# Patient Record
Sex: FEMALE | Race: WHITE | NOT HISPANIC OR LATINO
[De-identification: names, ages, dates, MRNs, and addresses within clinical notes are randomized per-mention and may not be internally consistent; named-entity substitution may affect disease eponyms.]

---

## 2024-09-30 PROBLEM — Z00.00 ENCOUNTER FOR PREVENTIVE HEALTH EXAMINATION: Status: ACTIVE | Noted: 2024-09-30

## 2024-11-01 ENCOUNTER — APPOINTMENT (OUTPATIENT)
Dept: ELECTROPHYSIOLOGY | Facility: CLINIC | Age: 54
End: 2024-11-01

## 2024-11-05 ENCOUNTER — RESULT REVIEW (OUTPATIENT)
Age: 54
End: 2024-11-05

## 2024-11-05 ENCOUNTER — APPOINTMENT (OUTPATIENT)
Dept: CARDIOLOGY | Facility: CLINIC | Age: 54
End: 2024-11-05

## 2024-11-05 ENCOUNTER — APPOINTMENT (OUTPATIENT)
Dept: MRI IMAGING | Facility: CLINIC | Age: 54
End: 2024-11-05

## 2024-11-05 ENCOUNTER — OUTPATIENT (OUTPATIENT)
Dept: OUTPATIENT SERVICES | Facility: HOSPITAL | Age: 54
LOS: 1 days | End: 2024-11-05
Payer: COMMERCIAL

## 2024-11-05 DIAGNOSIS — I47.29 OTHER VENTRICULAR TACHYCARDIA: ICD-10-CM

## 2024-11-05 PROCEDURE — 75561 CARDIAC MRI FOR MORPH W/DYE: CPT | Mod: 26

## 2024-11-05 PROCEDURE — 75565 CARD MRI VELOC FLOW MAPPING: CPT | Mod: 26

## 2024-11-11 ENCOUNTER — APPOINTMENT (OUTPATIENT)
Dept: ELECTROPHYSIOLOGY | Facility: CLINIC | Age: 54
End: 2024-11-11

## 2024-11-13 ENCOUNTER — NON-APPOINTMENT (OUTPATIENT)
Age: 54
End: 2024-11-13

## 2024-11-13 ENCOUNTER — APPOINTMENT (OUTPATIENT)
Dept: CARDIOLOGY | Facility: CLINIC | Age: 54
End: 2024-11-13
Payer: COMMERCIAL

## 2024-11-13 VITALS
OXYGEN SATURATION: 100 % | HEART RATE: 64 BPM | DIASTOLIC BLOOD PRESSURE: 64 MMHG | SYSTOLIC BLOOD PRESSURE: 113 MMHG | BODY MASS INDEX: 23.99 KG/M2 | WEIGHT: 162 LBS | HEIGHT: 69 IN

## 2024-11-13 PROCEDURE — 99215 OFFICE O/P EST HI 40 MIN: CPT

## 2024-11-13 PROCEDURE — G2211 COMPLEX E/M VISIT ADD ON: CPT | Mod: NC

## 2024-11-13 PROCEDURE — 93000 ELECTROCARDIOGRAM COMPLETE: CPT

## 2024-12-05 ENCOUNTER — APPOINTMENT (OUTPATIENT)
Dept: ELECTROPHYSIOLOGY | Facility: CLINIC | Age: 54
End: 2024-12-05

## 2025-01-16 ENCOUNTER — APPOINTMENT (OUTPATIENT)
Dept: CARDIOLOGY | Facility: CLINIC | Age: 55
End: 2025-01-16

## 2025-01-22 ENCOUNTER — NON-APPOINTMENT (OUTPATIENT)
Age: 55
End: 2025-01-22

## 2025-01-22 ENCOUNTER — APPOINTMENT (OUTPATIENT)
Dept: CARDIOLOGY | Facility: CLINIC | Age: 55
End: 2025-01-22
Payer: COMMERCIAL

## 2025-01-22 VITALS
OXYGEN SATURATION: 95 % | SYSTOLIC BLOOD PRESSURE: 122 MMHG | BODY MASS INDEX: 25.48 KG/M2 | HEART RATE: 70 BPM | WEIGHT: 172 LBS | DIASTOLIC BLOOD PRESSURE: 74 MMHG | HEIGHT: 69 IN

## 2025-01-22 PROCEDURE — 93000 ELECTROCARDIOGRAM COMPLETE: CPT

## 2025-01-22 PROCEDURE — 99214 OFFICE O/P EST MOD 30 MIN: CPT

## 2025-01-22 PROCEDURE — G2211 COMPLEX E/M VISIT ADD ON: CPT | Mod: NC

## 2025-01-24 ENCOUNTER — APPOINTMENT (OUTPATIENT)
Dept: ELECTROPHYSIOLOGY | Facility: CLINIC | Age: 55
End: 2025-01-24
Payer: COMMERCIAL

## 2025-01-24 VITALS
DIASTOLIC BLOOD PRESSURE: 69 MMHG | HEART RATE: 58 BPM | HEIGHT: 69 IN | BODY MASS INDEX: 25.18 KG/M2 | OXYGEN SATURATION: 98 % | SYSTOLIC BLOOD PRESSURE: 111 MMHG | WEIGHT: 170 LBS

## 2025-01-24 DIAGNOSIS — R00.2 PALPITATIONS: ICD-10-CM

## 2025-01-24 DIAGNOSIS — I47.29 OTHER VENTRICULAR TACHYCARDIA: ICD-10-CM

## 2025-01-24 PROCEDURE — G2211 COMPLEX E/M VISIT ADD ON: CPT | Mod: NC

## 2025-01-24 PROCEDURE — 99204 OFFICE O/P NEW MOD 45 MIN: CPT

## 2025-02-06 ENCOUNTER — APPOINTMENT (OUTPATIENT)
Dept: FAMILY MEDICINE | Facility: CLINIC | Age: 55
End: 2025-02-06
Payer: COMMERCIAL

## 2025-02-06 VITALS
TEMPERATURE: 97.7 F | DIASTOLIC BLOOD PRESSURE: 60 MMHG | BODY MASS INDEX: 25.18 KG/M2 | SYSTOLIC BLOOD PRESSURE: 110 MMHG | WEIGHT: 170 LBS | HEIGHT: 69 IN | OXYGEN SATURATION: 98 % | HEART RATE: 68 BPM

## 2025-02-06 DIAGNOSIS — I47.29 OTHER VENTRICULAR TACHYCARDIA: ICD-10-CM

## 2025-02-06 DIAGNOSIS — J02.9 ACUTE PHARYNGITIS, UNSPECIFIED: ICD-10-CM

## 2025-02-06 DIAGNOSIS — J06.9 ACUTE UPPER RESPIRATORY INFECTION, UNSPECIFIED: ICD-10-CM

## 2025-02-06 DIAGNOSIS — B00.1 HERPESVIRAL VESICULAR DERMATITIS: ICD-10-CM

## 2025-02-06 PROCEDURE — G2211 COMPLEX E/M VISIT ADD ON: CPT | Mod: NC

## 2025-02-06 PROCEDURE — 99214 OFFICE O/P EST MOD 30 MIN: CPT

## 2025-02-19 ENCOUNTER — TRANSCRIPTION ENCOUNTER (OUTPATIENT)
Age: 55
End: 2025-02-19

## 2025-02-19 ENCOUNTER — INPATIENT (INPATIENT)
Facility: HOSPITAL | Age: 55
LOS: 0 days | Discharge: HOME | DRG: 310 | End: 2025-02-20
Attending: INTERNAL MEDICINE | Admitting: INTERNAL MEDICINE
Payer: COMMERCIAL

## 2025-02-19 VITALS
RESPIRATION RATE: 16 BRPM | TEMPERATURE: 98 F | SYSTOLIC BLOOD PRESSURE: 161 MMHG | HEART RATE: 58 BPM | DIASTOLIC BLOOD PRESSURE: 73 MMHG | OXYGEN SATURATION: 96 %

## 2025-02-19 DIAGNOSIS — I47.20 VENTRICULAR TACHYCARDIA, UNSPECIFIED: ICD-10-CM

## 2025-02-19 LAB
ABO RH CONFIRMATION: SIGNIFICANT CHANGE UP
HCG SERPL QL: NEGATIVE — SIGNIFICANT CHANGE UP

## 2025-02-19 PROCEDURE — 93623 PRGRMD STIMJ&PACG IV RX NFS: CPT | Mod: 26

## 2025-02-19 PROCEDURE — 99152 MOD SED SAME PHYS/QHP 5/>YRS: CPT

## 2025-02-19 PROCEDURE — 93662 INTRACARDIAC ECG (ICE): CPT | Mod: 26

## 2025-02-19 PROCEDURE — 93654 COMPRE EP EVAL TX VT: CPT

## 2025-02-19 RX ORDER — ATORVASTATIN CALCIUM 80 MG/1
40 TABLET, FILM COATED ORAL AT BEDTIME
Refills: 0 | Status: DISCONTINUED | OUTPATIENT
Start: 2025-02-19 | End: 2025-02-20

## 2025-02-19 RX ORDER — ALPRAZOLAM 0.5 MG
0.25 TABLET, EXTENDED RELEASE 24 HR ORAL EVERY 6 HOURS
Refills: 0 | Status: DISCONTINUED | OUTPATIENT
Start: 2025-02-19 | End: 2025-02-20

## 2025-02-19 RX ORDER — ONDANSETRON HCL/PF 4 MG/2 ML
4 VIAL (ML) INJECTION EVERY 6 HOURS
Refills: 0 | Status: DISCONTINUED | OUTPATIENT
Start: 2025-02-19 | End: 2025-02-20

## 2025-02-19 RX ORDER — OXYCODONE HYDROCHLORIDE AND ACETAMINOPHEN 10; 325 MG/1; MG/1
1 TABLET ORAL EVERY 6 HOURS
Refills: 0 | Status: DISCONTINUED | OUTPATIENT
Start: 2025-02-19 | End: 2025-02-20

## 2025-02-19 RX ORDER — KETOROLAC TROMETHAMINE 30 MG/ML
15 INJECTION, SOLUTION INTRAMUSCULAR; INTRAVENOUS EVERY 6 HOURS
Refills: 0 | Status: DISCONTINUED | OUTPATIENT
Start: 2025-02-19 | End: 2025-02-20

## 2025-02-19 RX ORDER — ASPIRIN 325 MG
81 TABLET ORAL DAILY
Refills: 0 | Status: DISCONTINUED | OUTPATIENT
Start: 2025-02-19 | End: 2025-02-20

## 2025-02-19 RX ORDER — ACETAMINOPHEN 500 MG/5ML
650 LIQUID (ML) ORAL EVERY 6 HOURS
Refills: 0 | Status: DISCONTINUED | OUTPATIENT
Start: 2025-02-19 | End: 2025-02-20

## 2025-02-19 RX ORDER — MAGNESIUM, ALUMINUM HYDROXIDE 200-200 MG
30 TABLET,CHEWABLE ORAL EVERY 4 HOURS
Refills: 0 | Status: DISCONTINUED | OUTPATIENT
Start: 2025-02-19 | End: 2025-02-20

## 2025-02-19 RX ORDER — MAGNESIUM OXIDE 400 MG
400 TABLET ORAL DAILY
Refills: 0 | Status: DISCONTINUED | OUTPATIENT
Start: 2025-02-19 | End: 2025-02-20

## 2025-02-19 RX ORDER — ONDANSETRON HCL/PF 4 MG/2 ML
4 VIAL (ML) INJECTION EVERY 6 HOURS
Refills: 0 | Status: DISCONTINUED | OUTPATIENT
Start: 2025-02-19 | End: 2025-02-19

## 2025-02-19 RX ADMIN — Medication 81 MILLIGRAM(S): at 15:05

## 2025-02-19 RX ADMIN — Medication 400 MILLIGRAM(S): at 15:05

## 2025-02-19 RX ADMIN — OXYCODONE HYDROCHLORIDE AND ACETAMINOPHEN 1 TABLET(S): 10; 325 TABLET ORAL at 22:15

## 2025-02-19 RX ADMIN — Medication 650 MILLIGRAM(S): at 15:05

## 2025-02-19 RX ADMIN — OXYCODONE HYDROCHLORIDE AND ACETAMINOPHEN 1 TABLET(S): 10; 325 TABLET ORAL at 21:32

## 2025-02-19 RX ADMIN — ATORVASTATIN CALCIUM 40 MILLIGRAM(S): 80 TABLET, FILM COATED ORAL at 21:31

## 2025-02-19 RX ADMIN — KETOROLAC TROMETHAMINE 15 MILLIGRAM(S): 30 INJECTION, SOLUTION INTRAMUSCULAR; INTRAVENOUS at 18:01

## 2025-02-19 NOTE — DISCHARGE NOTE PROVIDER - NSDCFUADDINST_GEN_ALL_CORE_FT
Follow up with Dr. Conteh at [facility] in 3-4 weeks. Please call the office with any questions or concerns.     Take aspirin 81mg daily for 1 month.   Stop metoprolol and flecainide.   Continue other home medications.  Follow up with Dr. Conteh at [facility] in 3-4 weeks. Please call the office with any questions or concerns.     Take aspirin 81mg daily for 1 month.   Stop metoprolol only for now. Reduce Flecainide to 50mg twice daily.   Continue other home medications.

## 2025-02-19 NOTE — DISCHARGE NOTE PROVIDER - NSDCMRMEDTOKEN_GEN_ALL_CORE_FT
flecainide 100 mg oral tablet: 1 tab(s) orally 2 times a day  Lipitor 40 mg oral tablet: 1 tab(s) orally once a day (at bedtime)  magnesium oxide: 400 milligram(s) orally once a day  metoprolol succinate 25 mg oral capsule, extended release: 1 cap(s) orally once a day  Valtrex 1 g oral tablet: 2 tab(s) orally 2 times a day as needed for cold sore   aspirin 81 mg oral delayed release tablet: 1 tab(s) orally once a day x 30 days  Lipitor 40 mg oral tablet: 1 tab(s) orally once a day (at bedtime)  magnesium oxide: 400 milligram(s) orally once a day  Valtrex 1 g oral tablet: 2 tab(s) orally 2 times a day as needed for cold sore   aspirin 81 mg oral delayed release tablet: 1 tab(s) orally once a day x 30 days  flecainide 50 mg oral tablet: 1 tab(s) orally 2 times a day  Lipitor 40 mg oral tablet: 1 tab(s) orally once a day (at bedtime)  magnesium oxide: 400 milligram(s) orally once a day  Valtrex 1 g oral tablet: 2 tab(s) orally 2 times a day as needed for cold sore

## 2025-02-19 NOTE — DISCHARGE NOTE PROVIDER - CARE PROVIDER_API CALL
Jennifer Erwin  Cardiac Electrophysiology  [address] [place] [address]  Phone: [phone]  Fax: [phone]  Follow Up Time:     Andrea Conteh)  Cardiology  [address] [facility], [facility] [address]  [place] [address]  Phone: [phone]  Fax: [phone]  Follow Up Time:

## 2025-02-19 NOTE — DISCHARGE NOTE PROVIDER - HOSPITAL COURSE
55 year old female with MVP, lumbar and cervical herniated disks. She was recently hospitalized 9/2024 with palpitation and near syncope, found to have NSVT. She has been on flecainide and metoprolol with symptom relief. She presented electively and is now status post EPS and uncomplicated radiofrequency PVC ablation (RVOT). The patient was observed overnight without event and was discharged home the following morning with a plan for outpatient follow up.

## 2025-02-19 NOTE — DISCHARGE NOTE PROVIDER - NSDCCPTREATMENT_GEN_ALL_CORE_FT
PRINCIPAL PROCEDURE  Procedure: Radiofrequency ablation of arrhythmogenic focus for premature ventricular contractions or ventricular tachycardia  Findings and Treatment: - Bruising at the groin, sometimes extending down the leg, and/or a small lump under the skin at the groin access site is normal and will resolve within 2 – 3 weeks.   - Occasional skipped beats or palpitations that last for a few beats are common and generally resolve within 1-2 months.   - You may walk and take stairs at a regular pace.   - Do not perform any exercise more strenuous than walking for 1 week.   - Do not strain or lift heavy objects for 1 week.  - You may shower the day after the procedure.  - Do not soak in water (such as tub baths, hot tubs, swimming, etc.) for 1 week.   - You may resume all other activities the day after the procedure.  Call your doctor if:   - you notice bleeding, redness, drainage, swelling, increased tenderness or a hot sensation around the catheter insertion site.   - your temperature is greater than 100 degrees F for more than 24 hours.  - your rapid heart rhythm returns.  - you have any questions or concerns regarding the procedure.  If significant bleeding and/or a large lump (the size of a golf ball or bigger) occurs:  - Lie flat and apply continuous direct pressure just above the puncture site for at least 10 minutes  - If the issue resolves, notify your physician immediately.    - If the bleeding cannot be controlled, please seek immediate medical attention.  If you experience increased difficulty breathing or chest pain, or if you faint or have dizzy spells, please seek immediate medical attention.

## 2025-02-19 NOTE — PROGRESS NOTE ADULT - SUBJECTIVE AND OBJECTIVE BOX
PROCEDURE(S): Radiofrequency PVC Ablation   ELECTROPHYSIOLOGIST(S): Jennifer Erwin MD  COMPLICATIONS:  none        DISPOSITION: observation      CONDITION: stable    Pt doing well s/p radiofrequency PVC ablation via b/l FV access. Denies complaint post procedure.     I/Os: net +    MEDICATIONS  (STANDING):    MEDICATIONS  (PRN):  acetaminophen     Tablet .. 650 milliGRAM(s) Oral every 6 hours PRN Mild Pain (1 - 3), Moderate Pain (4 - 6)  ALPRAZolam 0.25 milliGRAM(s) Oral every 6 hours PRN anxiety/insomnia  aluminum hydroxide/magnesium hydroxide/simethicone Suspension 30 milliLiter(s) Oral every 4 hours PRN Dyspepsia  benzocaine/menthol Lozenge 1 Lozenge Oral every 2 hours PRN Sore Throat  ketorolac   Injectable 15 milliGRAM(s) IV Push every 6 hours PRN Moderate Pain (4 - 6)  ondansetron  IVPB 4 milliGRAM(s) IV Intermittent every 6 hours PRN Nausea and/or Vomiting  oxycodone    5 mG/acetaminophen 325 mG 1 Tablet(s) Oral every 6 hours PRN Severe Pain (7 - 10)    Allergies:  No Known Allergies    T(C): 36.5 (02-19-25 @ 07:13), Max: 36.5 (02-19-25 @ 07:13)  HR: 52 (02-19-25 @ 11:10) (52 - 58)  BP: 88/49 (02-19-25 @ 11:10) (88/49 - 161/73)  RR: 16 (02-19-25 @ 11:10) (16 - 16)  SpO2: 96% (02-19-25 @ 11:10) (96% - 96%)  Post-procedure VS: BP 89/48 HR 52 O2 sat 94% RR 16     Physical exam:   awake, alert, no obvious distress   Card: S1/S2, RRR, no m/g/r  Resp: lungs CTA b/l  Abd: S/NT/ND  Groins: hemostatic sutures in place; sites C/D/I; no bleeding, hematoma, erythema, exudate or edema  Ext: no edema; distal pulses intact  Skin: warm, dry, no rash     ECG: sinus bradycardia 52bpm     Assessment:   55 year old female with MVP, lumbar and cervical herniated disks. She was recently hospitalized 9/2024 with palpitation and near syncope, found to have NSVT. She has been on flecainide and metoprolol with symptom relief. She presented electively and is now status post EPS and uncomplicated radiofrequency PVC ablation (RVOT).     Plan:   Bedrest x 4 hours, then OOB with assistance and progress as tolerated.   Groin sutures to be removed by EP service in AM.   Continue home medications.   Please encourage incentive spirometry and ambulation once able.  Observation and monitoring on telemetry overnight with anticipated discharge in the AM and outpt follow up in 2-4 weeks.   PROCEDURE(S): Radiofrequency PVC Ablation   ELECTROPHYSIOLOGIST(S): Jennifer Erwin MD  COMPLICATIONS:  none        DISPOSITION: observation      CONDITION: stable    Pt doing well s/p radiofrequency PVC (RVOT) ablation via b/l FV access. Denies complaint post procedure.     I/Os: net +    MEDICATIONS  (STANDING):    MEDICATIONS  (PRN):  acetaminophen     Tablet .. 650 milliGRAM(s) Oral every 6 hours PRN Mild Pain (1 - 3), Moderate Pain (4 - 6)  ALPRAZolam 0.25 milliGRAM(s) Oral every 6 hours PRN anxiety/insomnia  aluminum hydroxide/magnesium hydroxide/simethicone Suspension 30 milliLiter(s) Oral every 4 hours PRN Dyspepsia  benzocaine/menthol Lozenge 1 Lozenge Oral every 2 hours PRN Sore Throat  ketorolac   Injectable 15 milliGRAM(s) IV Push every 6 hours PRN Moderate Pain (4 - 6)  ondansetron  IVPB 4 milliGRAM(s) IV Intermittent every 6 hours PRN Nausea and/or Vomiting  oxycodone    5 mG/acetaminophen 325 mG 1 Tablet(s) Oral every 6 hours PRN Severe Pain (7 - 10)    Allergies:  No Known Allergies    T(C): 36.5 (02-19-25 @ 07:13), Max: 36.5 (02-19-25 @ 07:13)  HR: 52 (02-19-25 @ 11:10) (52 - 58)  BP: 88/49 (02-19-25 @ 11:10) (88/49 - 161/73)  RR: 16 (02-19-25 @ 11:10) (16 - 16)  SpO2: 96% (02-19-25 @ 11:10) (96% - 96%)  Post-procedure VS: BP 89/48 HR 52 O2 sat 94% RR 16     Physical exam:   awake, alert, no obvious distress   Card: S1/S2, RRR, no m/g/r  Resp: lungs CTA b/l  Abd: S/NT/ND  Groins: hemostatic sutures in place; sites C/D/I; no bleeding, hematoma, erythema, exudate or edema  Ext: no edema; distal pulses intact  Skin: warm, dry, no rash     ECG: sinus bradycardia 52bpm     Assessment:   55 year old female with MVP, lumbar and cervical herniated disks. She was recently hospitalized 9/2024 with palpitation and near syncope, found to have NSVT. She has been on flecainide and metoprolol with symptom relief. She presented electively and is now status post EPS and uncomplicated radiofrequency PVC ablation (RVOT).     Plan:   Bedrest x 4 hours, then OOB with assistance and progress as tolerated.   Groin sutures to be removed by EP service in AM.   Start aspirin 81mg daily for 1 month.   Stop flecainide and metoprolol.   Continue other home medications.   Please encourage incentive spirometry and ambulation once able.  Observation and monitoring on telemetry overnight with anticipated discharge in the AM and outpt follow up in 2-4 weeks.

## 2025-02-19 NOTE — PROGRESS NOTE ADULT - SUBJECTIVE AND OBJECTIVE BOX
55 year old female with MVP, lumbar and cervical herniated disks. She was recently hospitalized with c/o for dizziness and palpitation, found to have NSVT and PVCs.     55 year old female with MVP, lumbar and cervical herniated disks. She was recently hospitalized 9/2024 with palpitation and near syncope, found to have NSVT. She has been on flecainide and metoprolol with symptom relief. She presents today for elective EPS and catheter ablation of PVC/VT.     PST and labs from 2/13/25 reviewed; pt reports exacerbated back pain over the past few days. She feels she will be able to lie flat during recovery.   Confirms NPO > 8 hrs. Last flecainide and metoprolol 2/14/25.     - iv heplock  - confirmatory type and screen  - 2 units PRBC on hold  - consent w/ MD

## 2025-02-19 NOTE — DISCHARGE NOTE PROVIDER - CARE PROVIDERS DIRECT ADDRESSES
[email or web address]

## 2025-02-19 NOTE — ASU PATIENT PROFILE, ADULT - NSICDXPASTMEDICALHX_GEN_ALL_CORE_FT
PAST MEDICAL HISTORY:  H/O ventricular tachycardia     Herniated cervical disc     Herpes     MVP (mitral valve prolapse)     NSVT (nonsustained ventricular tachycardia)

## 2025-02-20 ENCOUNTER — TRANSCRIPTION ENCOUNTER (OUTPATIENT)
Age: 55
End: 2025-02-20

## 2025-02-20 VITALS
OXYGEN SATURATION: 95 % | TEMPERATURE: 98 F | SYSTOLIC BLOOD PRESSURE: 116 MMHG | RESPIRATION RATE: 18 BRPM | HEART RATE: 58 BPM | DIASTOLIC BLOOD PRESSURE: 69 MMHG

## 2025-02-20 DIAGNOSIS — I47.20 VENTRICULAR TACHYCARDIA, UNSPECIFIED: ICD-10-CM

## 2025-02-20 DIAGNOSIS — I49.3 VENTRICULAR PREMATURE DEPOLARIZATION: ICD-10-CM

## 2025-02-20 LAB
ANION GAP SERPL CALC-SCNC: 11 MMOL/L — SIGNIFICANT CHANGE UP (ref 5–17)
BUN SERPL-MCNC: 12.9 MG/DL — SIGNIFICANT CHANGE UP (ref 8–20)
CALCIUM SERPL-MCNC: 8.5 MG/DL — SIGNIFICANT CHANGE UP (ref 8.4–10.5)
CHLORIDE SERPL-SCNC: 106 MMOL/L — SIGNIFICANT CHANGE UP (ref 96–108)
CO2 SERPL-SCNC: 24 MMOL/L — SIGNIFICANT CHANGE UP (ref 22–29)
CREAT SERPL-MCNC: 0.54 MG/DL — SIGNIFICANT CHANGE UP (ref 0.5–1.3)
EGFR: 109 ML/MIN/1.73M2 — SIGNIFICANT CHANGE UP
GLUCOSE SERPL-MCNC: 95 MG/DL — SIGNIFICANT CHANGE UP (ref 70–99)
HCT VFR BLD CALC: 38.4 % — SIGNIFICANT CHANGE UP (ref 34.5–45)
HGB BLD-MCNC: 12.3 G/DL — SIGNIFICANT CHANGE UP (ref 11.5–15.5)
MAGNESIUM SERPL-MCNC: 2 MG/DL — SIGNIFICANT CHANGE UP (ref 1.6–2.6)
MCHC RBC-ENTMCNC: 30.7 PG — SIGNIFICANT CHANGE UP (ref 27–34)
MCHC RBC-ENTMCNC: 32 G/DL — SIGNIFICANT CHANGE UP (ref 32–36)
MCV RBC AUTO: 95.8 FL — SIGNIFICANT CHANGE UP (ref 80–100)
NRBC # BLD AUTO: 0 K/UL — SIGNIFICANT CHANGE UP (ref 0–0)
NRBC # FLD: 0 K/UL — SIGNIFICANT CHANGE UP (ref 0–0)
NRBC BLD AUTO-RTO: 0 /100 WBCS — SIGNIFICANT CHANGE UP (ref 0–0)
PLATELET # BLD AUTO: 240 K/UL — SIGNIFICANT CHANGE UP (ref 150–400)
PMV BLD: 9.5 FL — SIGNIFICANT CHANGE UP (ref 7–13)
POTASSIUM SERPL-MCNC: 4.5 MMOL/L — SIGNIFICANT CHANGE UP (ref 3.5–5.3)
POTASSIUM SERPL-SCNC: 4.5 MMOL/L — SIGNIFICANT CHANGE UP (ref 3.5–5.3)
RBC # BLD: 4.01 M/UL — SIGNIFICANT CHANGE UP (ref 3.8–5.2)
RBC # FLD: 12.8 % — SIGNIFICANT CHANGE UP (ref 10.3–14.5)
SODIUM SERPL-SCNC: 141 MMOL/L — SIGNIFICANT CHANGE UP (ref 135–145)
WBC # BLD: 6.09 K/UL — SIGNIFICANT CHANGE UP (ref 3.8–10.5)
WBC # FLD AUTO: 6.09 K/UL — SIGNIFICANT CHANGE UP (ref 3.8–10.5)

## 2025-02-20 PROCEDURE — C1759: CPT

## 2025-02-20 PROCEDURE — 93623 PRGRMD STIMJ&PACG IV RX NFS: CPT

## 2025-02-20 PROCEDURE — 36415 COLL VENOUS BLD VENIPUNCTURE: CPT

## 2025-02-20 PROCEDURE — C1766: CPT

## 2025-02-20 PROCEDURE — C1730: CPT

## 2025-02-20 PROCEDURE — C1732: CPT

## 2025-02-20 PROCEDURE — 80048 BASIC METABOLIC PNL TOTAL CA: CPT

## 2025-02-20 PROCEDURE — 83735 ASSAY OF MAGNESIUM: CPT

## 2025-02-20 PROCEDURE — 99231 SBSQ HOSP IP/OBS SF/LOW 25: CPT

## 2025-02-20 PROCEDURE — C1894: CPT

## 2025-02-20 PROCEDURE — 93010 ELECTROCARDIOGRAM REPORT: CPT

## 2025-02-20 PROCEDURE — 93005 ELECTROCARDIOGRAM TRACING: CPT

## 2025-02-20 PROCEDURE — 85027 COMPLETE CBC AUTOMATED: CPT

## 2025-02-20 PROCEDURE — 93654 COMPRE EP EVAL TX VT: CPT

## 2025-02-20 PROCEDURE — 93662 INTRACARDIAC ECG (ICE): CPT

## 2025-02-20 PROCEDURE — 84703 CHORIONIC GONADOTROPIN ASSAY: CPT

## 2025-02-20 RX ORDER — ASPIRIN 325 MG
1 TABLET ORAL
Qty: 30 | Refills: 0
Start: 2025-02-20 | End: 2025-03-21

## 2025-02-20 RX ORDER — FLECAINIDE ACETATE 50 MG
1 TABLET ORAL
Qty: 60 | Refills: 0
Start: 2025-02-20 | End: 2025-03-21

## 2025-02-20 RX ADMIN — Medication 0.25 MILLIGRAM(S): at 01:10

## 2025-02-20 RX ADMIN — OXYCODONE HYDROCHLORIDE AND ACETAMINOPHEN 1 TABLET(S): 10; 325 TABLET ORAL at 07:52

## 2025-02-20 RX ADMIN — Medication 650 MILLIGRAM(S): at 02:00

## 2025-02-20 RX ADMIN — OXYCODONE HYDROCHLORIDE AND ACETAMINOPHEN 1 TABLET(S): 10; 325 TABLET ORAL at 08:52

## 2025-02-20 RX ADMIN — Medication 81 MILLIGRAM(S): at 07:52

## 2025-02-20 RX ADMIN — Medication 400 MILLIGRAM(S): at 07:53

## 2025-02-20 RX ADMIN — Medication 650 MILLIGRAM(S): at 01:09

## 2025-02-20 NOTE — PROGRESS NOTE ADULT - SUBJECTIVE AND OBJECTIVE BOX
Patient seen today in bed. Figure 8 sutures removed from right and left groin without complications. No overnight events.     EKG: SR at 57bpm; QRSD 80ms; AK 166ms  TELE: SR, no PVCs.     MEDICATIONS  (STANDING):  aspirin enteric coated 81 milliGRAM(s) Oral daily  atorvastatin 40 milliGRAM(s) Oral at bedtime  magnesium oxide 400 milliGRAM(s) Oral daily    MEDICATIONS  (PRN):  acetaminophen     Tablet .. 650 milliGRAM(s) Oral every 6 hours PRN Mild Pain (1 - 3), Moderate Pain (4 - 6)  ALPRAZolam 0.25 milliGRAM(s) Oral every 6 hours PRN anxiety/insomnia  aluminum hydroxide/magnesium hydroxide/simethicone Suspension 30 milliLiter(s) Oral every 4 hours PRN Dyspepsia  benzocaine/menthol Lozenge 1 Lozenge Oral every 2 hours PRN Sore Throat  ketorolac   Injectable 15 milliGRAM(s) IV Push every 6 hours PRN Moderate Pain (4 - 6)  ondansetron Injectable 4 milliGRAM(s) IV Push every 6 hours PRN Nausea and/or Vomiting  oxycodone    5 mG/acetaminophen 325 mG 1 Tablet(s) Oral every 6 hours PRN Severe Pain (7 - 10)    Allergies  No Known Allergies    PAST MEDICAL & SURGICAL HISTORY:  H/O ventricular tachycardia  MVP (mitral valve prolapse)  Herniated cervical disc  NSVT (nonsustained ventricular tachycardia)  Herpes    Vital Signs Last 24 Hrs  T(C): 36.6 (20 Feb 2025 08:44), Max: 36.6 (20 Feb 2025 08:44)  T(F): 97.8 (20 Feb 2025 08:44), Max: 97.8 (20 Feb 2025 08:44)  HR: 58 (20 Feb 2025 08:44) (44 - 61)  BP: 116/69 (20 Feb 2025 08:44) (83/46 - 146/78)  BP(mean): 89 (20 Feb 2025 01:00) (89 - 101)  RR: 18 (20 Feb 2025 08:44) (16 - 19)  SpO2: 95% (20 Feb 2025 08:44) (94% - 98%)    Physical Exam:  Constitutional: NAD, AAOx3  Cardiovascular: +S1S2 RRR  Pulmonary: CTA b/l, unlabored  GI: soft NTND +BS  Extremities: no pedal edema,   Right and left groin: No hematoma or ecchymosis noted.   Neuro: non focal, ARCHIBALD x4    LABS:                        12.3   6.09  )-----------( 240      ( 20 Feb 2025 04:37 )             38.4     141  |  106  |  12.9  ----------------------------<  95  4.5   |  24.0  |  0.54  Ca    8.5      20 Feb 2025 04:37  Mg     2.0     02-20    A/P  55 year old female with MVP, lumbar and cervical herniated disks. She was recently hospitalized 9/2024 with palpitation and near syncope, found to have NSVT. She has been on flecainide and metoprolol with symptom relief. She presented electively and is now POD#1 status post EPS and uncomplicated radiofrequency PVC ablation (RVOT).     - ASA 81mg x 30 days Rx sent to pharmacy  - Off Flecainide and Lopressor  - Discharge home today.      Patient seen today in bed. Figure 8 sutures removed from right and left groin without complications. No overnight events.     EKG: SR at 57bpm; QRSD 80ms; WA 166ms  TELE: SR, no PVCs.     MEDICATIONS  (STANDING):  aspirin enteric coated 81 milliGRAM(s) Oral daily  atorvastatin 40 milliGRAM(s) Oral at bedtime  magnesium oxide 400 milliGRAM(s) Oral daily    MEDICATIONS  (PRN):  acetaminophen     Tablet .. 650 milliGRAM(s) Oral every 6 hours PRN Mild Pain (1 - 3), Moderate Pain (4 - 6)  ALPRAZolam 0.25 milliGRAM(s) Oral every 6 hours PRN anxiety/insomnia  aluminum hydroxide/magnesium hydroxide/simethicone Suspension 30 milliLiter(s) Oral every 4 hours PRN Dyspepsia  benzocaine/menthol Lozenge 1 Lozenge Oral every 2 hours PRN Sore Throat  ketorolac   Injectable 15 milliGRAM(s) IV Push every 6 hours PRN Moderate Pain (4 - 6)  ondansetron Injectable 4 milliGRAM(s) IV Push every 6 hours PRN Nausea and/or Vomiting  oxycodone    5 mG/acetaminophen 325 mG 1 Tablet(s) Oral every 6 hours PRN Severe Pain (7 - 10)    Allergies  No Known Allergies    PAST MEDICAL & SURGICAL HISTORY:  H/O ventricular tachycardia  MVP (mitral valve prolapse)  Herniated cervical disc  NSVT (nonsustained ventricular tachycardia)  Herpes    Vital Signs Last 24 Hrs  T(C): 36.6 (20 Feb 2025 08:44), Max: 36.6 (20 Feb 2025 08:44)  T(F): 97.8 (20 Feb 2025 08:44), Max: 97.8 (20 Feb 2025 08:44)  HR: 58 (20 Feb 2025 08:44) (44 - 61)  BP: 116/69 (20 Feb 2025 08:44) (83/46 - 146/78)  BP(mean): 89 (20 Feb 2025 01:00) (89 - 101)  RR: 18 (20 Feb 2025 08:44) (16 - 19)  SpO2: 95% (20 Feb 2025 08:44) (94% - 98%)    Physical Exam:  Constitutional: NAD, AAOx3  Cardiovascular: +S1S2 RRR  Pulmonary: CTA b/l, unlabored  GI: soft NTND +BS  Extremities: no pedal edema,   Right and left groin: No hematoma or ecchymosis noted.   Neuro: non focal, ARCHIBALD x4    LABS:                        12.3   6.09  )-----------( 240      ( 20 Feb 2025 04:37 )             38.4     141  |  106  |  12.9  ----------------------------<  95  4.5   |  24.0  |  0.54  Ca    8.5      20 Feb 2025 04:37  Mg     2.0     02-20    A/P  55 year old female with MVP, lumbar and cervical herniated disks. She was recently hospitalized 9/2024 with palpitation and near syncope, found to have NSVT. She has been on flecainide and metoprolol with symptom relief. She presented electively and is now POD#1 status post EPS and uncomplicated radiofrequency PVC ablation (RVOT).     - ASA 81mg x 30 days Rx sent to pharmacy  - Off Lopressor  - Reduce Flecainide to 50mg PO BID   - Discharge home today.      Patient seen today in bed. Figure 8 sutures removed from right and left groin without complications. No overnight events.     EKG: SR at 57bpm; QRSD 80ms; MN 166ms  TELE: SR, occasional pvc    MEDICATIONS  (STANDING):  aspirin enteric coated 81 milliGRAM(s) Oral daily  atorvastatin 40 milliGRAM(s) Oral at bedtime  magnesium oxide 400 milliGRAM(s) Oral daily    MEDICATIONS  (PRN):  acetaminophen     Tablet .. 650 milliGRAM(s) Oral every 6 hours PRN Mild Pain (1 - 3), Moderate Pain (4 - 6)  ALPRAZolam 0.25 milliGRAM(s) Oral every 6 hours PRN anxiety/insomnia  aluminum hydroxide/magnesium hydroxide/simethicone Suspension 30 milliLiter(s) Oral every 4 hours PRN Dyspepsia  benzocaine/menthol Lozenge 1 Lozenge Oral every 2 hours PRN Sore Throat  ketorolac   Injectable 15 milliGRAM(s) IV Push every 6 hours PRN Moderate Pain (4 - 6)  ondansetron Injectable 4 milliGRAM(s) IV Push every 6 hours PRN Nausea and/or Vomiting  oxycodone    5 mG/acetaminophen 325 mG 1 Tablet(s) Oral every 6 hours PRN Severe Pain (7 - 10)    Allergies  No Known Allergies    PAST MEDICAL & SURGICAL HISTORY:  H/O ventricular tachycardia  MVP (mitral valve prolapse)  Herniated cervical disc  NSVT (nonsustained ventricular tachycardia)  Herpes    Vital Signs Last 24 Hrs  T(C): 36.6 (20 Feb 2025 08:44), Max: 36.6 (20 Feb 2025 08:44)  T(F): 97.8 (20 Feb 2025 08:44), Max: 97.8 (20 Feb 2025 08:44)  HR: 58 (20 Feb 2025 08:44) (44 - 61)  BP: 116/69 (20 Feb 2025 08:44) (83/46 - 146/78)  BP(mean): 89 (20 Feb 2025 01:00) (89 - 101)  RR: 18 (20 Feb 2025 08:44) (16 - 19)  SpO2: 95% (20 Feb 2025 08:44) (94% - 98%)    Physical Exam:  Constitutional: NAD, AAOx3  Cardiovascular: +S1S2 RRR  Pulmonary: CTA b/l, unlabored  GI: soft NTND +BS  Extremities: no pedal edema,   Right and left groin: No hematoma or ecchymosis noted.   Neuro: non focal, ARCHIBALD x4    LABS:                        12.3   6.09  )-----------( 240      ( 20 Feb 2025 04:37 )             38.4     141  |  106  |  12.9  ----------------------------<  95  4.5   |  24.0  |  0.54  Ca    8.5      20 Feb 2025 04:37  Mg     2.0     02-20    A/P  55 year old female with MVP, lumbar and cervical herniated disks. She was recently hospitalized 9/2024 with palpitation and near syncope, found to have NSVT. She has been on flecainide and metoprolol with symptom relief. She presented electively and is now POD#1 status post EPS and uncomplicated radiofrequency PVC ablation (RVOT).     - ASA 81mg x 30 days Rx sent to pharmacy  - Off Lopressor  - Reduce Flecainide to 50mg PO BID   - Discharge home today.

## 2025-02-20 NOTE — DISCHARGE NOTE NURSING/CASE MANAGEMENT/SOCIAL WORK - PATIENT PORTAL LINK FT
You can access the FollowMyHealth Patient Portal offered by [facility] by registering at the following website: [email or web address]. By joining [facility]’s FollowMyHealth portal, you will also be able to view your health information using other applications (apps) compatible with our system.

## 2025-02-20 NOTE — PROGRESS NOTE ADULT - NS ATTEND AMEND GEN_ALL_CORE FT
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

## 2025-02-20 NOTE — DISCHARGE NOTE NURSING/CASE MANAGEMENT/SOCIAL WORK - FINANCIAL ASSISTANCE
[facility] provides services at a reduced cost to those who are determined to be eligible through [facility]’s financial assistance program. Information regarding [facility]’s financial assistance program can be found by going to [email or web address] or by calling [phone].

## 2025-02-26 ENCOUNTER — APPOINTMENT (OUTPATIENT)
Dept: FAMILY MEDICINE | Facility: CLINIC | Age: 55
End: 2025-02-26
Payer: COMMERCIAL

## 2025-02-26 VITALS
DIASTOLIC BLOOD PRESSURE: 70 MMHG | HEIGHT: 68 IN | HEART RATE: 59 BPM | SYSTOLIC BLOOD PRESSURE: 100 MMHG | OXYGEN SATURATION: 95 % | WEIGHT: 172 LBS | TEMPERATURE: 98.8 F | BODY MASS INDEX: 26.07 KG/M2

## 2025-02-26 DIAGNOSIS — M54.2 CERVICALGIA: ICD-10-CM

## 2025-02-26 DIAGNOSIS — M54.9 CERVICALGIA: ICD-10-CM

## 2025-02-26 DIAGNOSIS — E66.3 OVERWEIGHT: ICD-10-CM

## 2025-02-26 DIAGNOSIS — G89.29 CERVICALGIA: ICD-10-CM

## 2025-02-26 PROCEDURE — 99214 OFFICE O/P EST MOD 30 MIN: CPT

## 2025-02-26 PROCEDURE — G2211 COMPLEX E/M VISIT ADD ON: CPT | Mod: NC

## 2025-02-28 PROBLEM — E66.3 OVERWEIGHT: Status: ACTIVE | Noted: 2025-02-28

## 2025-02-28 RX ORDER — METHOCARBAMOL 750 MG/1
750 TABLET, FILM COATED ORAL 3 TIMES DAILY
Qty: 30 | Refills: 0 | Status: ACTIVE | COMMUNITY
Start: 2025-02-28 | End: 1900-01-01

## 2025-03-04 ENCOUNTER — APPOINTMENT (OUTPATIENT)
Dept: CARDIOLOGY | Facility: CLINIC | Age: 55
End: 2025-03-04
Payer: COMMERCIAL

## 2025-03-04 ENCOUNTER — NON-APPOINTMENT (OUTPATIENT)
Age: 55
End: 2025-03-04

## 2025-03-04 VITALS
BODY MASS INDEX: 26.22 KG/M2 | SYSTOLIC BLOOD PRESSURE: 128 MMHG | DIASTOLIC BLOOD PRESSURE: 70 MMHG | HEIGHT: 68 IN | WEIGHT: 173 LBS | OXYGEN SATURATION: 94 % | HEART RATE: 64 BPM

## 2025-03-04 PROCEDURE — 93000 ELECTROCARDIOGRAM COMPLETE: CPT

## 2025-03-04 PROCEDURE — 99215 OFFICE O/P EST HI 40 MIN: CPT

## 2025-03-04 PROCEDURE — G2211 COMPLEX E/M VISIT ADD ON: CPT | Mod: NC

## 2025-03-07 RX ORDER — SEMAGLUTIDE 0.68 MG/ML
2 INJECTION, SOLUTION SUBCUTANEOUS
Qty: 1 | Refills: 1 | Status: DISCONTINUED | COMMUNITY
Start: 2025-02-28 | End: 2025-03-04

## 2025-03-12 ENCOUNTER — APPOINTMENT (OUTPATIENT)
Dept: CARDIOLOGY | Facility: CLINIC | Age: 55
End: 2025-03-12

## 2025-03-12 PROBLEM — I47.29 OTHER VENTRICULAR TACHYCARDIA: Chronic | Status: ACTIVE | Noted: 2025-02-13

## 2025-03-12 PROBLEM — B00.9 HERPESVIRAL INFECTION, UNSPECIFIED: Chronic | Status: ACTIVE | Noted: 2025-02-13

## 2025-03-12 PROBLEM — M50.20 OTHER CERVICAL DISC DISPLACEMENT, UNSPECIFIED CERVICAL REGION: Chronic | Status: ACTIVE | Noted: 2025-02-13

## 2025-03-12 PROBLEM — Z86.79 PERSONAL HISTORY OF OTHER DISEASES OF THE CIRCULATORY SYSTEM: Chronic | Status: ACTIVE | Noted: 2025-02-13

## 2025-03-12 PROCEDURE — 99213 OFFICE O/P EST LOW 20 MIN: CPT | Mod: 95

## 2025-03-12 PROCEDURE — G2211 COMPLEX E/M VISIT ADD ON: CPT | Mod: NC,95

## 2025-03-19 ENCOUNTER — APPOINTMENT (OUTPATIENT)
Dept: ELECTROPHYSIOLOGY | Facility: CLINIC | Age: 55
End: 2025-03-19
Payer: COMMERCIAL

## 2025-03-19 VITALS
HEART RATE: 61 BPM | HEIGHT: 68 IN | OXYGEN SATURATION: 96 % | BODY MASS INDEX: 26.22 KG/M2 | SYSTOLIC BLOOD PRESSURE: 106 MMHG | WEIGHT: 173 LBS | DIASTOLIC BLOOD PRESSURE: 67 MMHG

## 2025-03-19 DIAGNOSIS — I49.3 VENTRICULAR PREMATURE DEPOLARIZATION: ICD-10-CM

## 2025-03-19 DIAGNOSIS — I47.10 SUPRAVENTRICULAR TACHYCARDIA, UNSPECIFIED: ICD-10-CM

## 2025-03-19 DIAGNOSIS — R00.2 PALPITATIONS: ICD-10-CM

## 2025-03-19 DIAGNOSIS — I47.29 OTHER VENTRICULAR TACHYCARDIA: ICD-10-CM

## 2025-03-19 PROCEDURE — 99214 OFFICE O/P EST MOD 30 MIN: CPT

## 2025-03-19 PROCEDURE — G2211 COMPLEX E/M VISIT ADD ON: CPT | Mod: NC

## 2025-03-19 RX ORDER — METOPROLOL SUCCINATE 25 MG/1
25 TABLET, EXTENDED RELEASE ORAL
Qty: 90 | Refills: 3 | Status: ACTIVE | COMMUNITY
Start: 2025-03-19

## 2025-04-11 ENCOUNTER — OUTPATIENT (OUTPATIENT)
Dept: OUTPATIENT SERVICES | Facility: HOSPITAL | Age: 55
LOS: 1 days | End: 2025-04-11
Payer: COMMERCIAL

## 2025-04-11 DIAGNOSIS — G47.33 OBSTRUCTIVE SLEEP APNEA (ADULT) (PEDIATRIC): ICD-10-CM

## 2025-04-11 PROCEDURE — 95800 SLP STDY UNATTENDED: CPT

## 2025-04-11 PROCEDURE — 95800 SLP STDY UNATTENDED: CPT | Mod: 26

## 2025-04-28 ENCOUNTER — APPOINTMENT (OUTPATIENT)
Dept: CARDIOLOGY | Facility: CLINIC | Age: 55
End: 2025-04-28

## 2025-04-28 ENCOUNTER — NON-APPOINTMENT (OUTPATIENT)
Age: 55
End: 2025-04-28

## 2025-08-09 ENCOUNTER — EMERGENCY (EMERGENCY)
Facility: HOSPITAL | Age: 55
LOS: 0 days | Discharge: HOME | End: 2025-08-09
Attending: EMERGENCY MEDICINE
Payer: COMMERCIAL

## 2025-08-09 VITALS
SYSTOLIC BLOOD PRESSURE: 121 MMHG | TEMPERATURE: 98 F | WEIGHT: 178.35 LBS | DIASTOLIC BLOOD PRESSURE: 59 MMHG | OXYGEN SATURATION: 95 % | HEART RATE: 71 BPM | RESPIRATION RATE: 18 BRPM
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
## 2025-08-09 DIAGNOSIS — S92.911A UNSPECIFIED FRACTURE OF RIGHT TOE(S), INITIAL ENCOUNTER FOR CLOSED FRACTURE: ICD-10-CM

## 2025-08-09 DIAGNOSIS — M79.674 PAIN IN RIGHT TOE(S): ICD-10-CM

## 2025-08-09 PROCEDURE — 73630 X-RAY EXAM OF FOOT: CPT | Mod: RT

## 2025-08-09 PROCEDURE — 73630 X-RAY EXAM OF FOOT: CPT | Mod: 26,RT

## 2025-08-09 PROCEDURE — 99284 EMERGENCY DEPT VISIT MOD MDM: CPT

## 2025-08-09 PROCEDURE — 99283 EMERGENCY DEPT VISIT LOW MDM: CPT | Mod: 25

## 2025-08-14 ENCOUNTER — APPOINTMENT (OUTPATIENT)
Dept: ELECTROPHYSIOLOGY | Facility: CLINIC | Age: 55
End: 2025-08-14

## 2025-08-14 VITALS
SYSTOLIC BLOOD PRESSURE: 111 MMHG | HEIGHT: 68 IN | BODY MASS INDEX: 26.22 KG/M2 | HEART RATE: 62 BPM | OXYGEN SATURATION: 99 % | WEIGHT: 173 LBS | DIASTOLIC BLOOD PRESSURE: 41 MMHG

## 2025-08-14 DIAGNOSIS — I47.10 SUPRAVENTRICULAR TACHYCARDIA, UNSPECIFIED: ICD-10-CM
